# Patient Record
Sex: MALE | Race: BLACK OR AFRICAN AMERICAN | NOT HISPANIC OR LATINO | URBAN - METROPOLITAN AREA
[De-identification: names, ages, dates, MRNs, and addresses within clinical notes are randomized per-mention and may not be internally consistent; named-entity substitution may affect disease eponyms.]

---

## 2021-06-14 ENCOUNTER — EMERGENCY (EMERGENCY)
Facility: HOSPITAL | Age: 31
LOS: 1 days | Discharge: HOME | End: 2021-06-14
Admitting: EMERGENCY MEDICINE
Payer: MEDICARE

## 2021-06-14 VITALS
SYSTOLIC BLOOD PRESSURE: 136 MMHG | OXYGEN SATURATION: 97 % | HEART RATE: 78 BPM | DIASTOLIC BLOOD PRESSURE: 90 MMHG | HEIGHT: 70 IN | WEIGHT: 179.9 LBS | TEMPERATURE: 99 F | RESPIRATION RATE: 16 BRPM

## 2021-06-14 DIAGNOSIS — J02.9 ACUTE PHARYNGITIS, UNSPECIFIED: ICD-10-CM

## 2021-06-14 DIAGNOSIS — R59.9 ENLARGED LYMPH NODES, UNSPECIFIED: ICD-10-CM

## 2021-06-14 DIAGNOSIS — Z20.822 CONTACT WITH AND (SUSPECTED) EXPOSURE TO COVID-19: ICD-10-CM

## 2021-06-14 LAB
S PYO AG SPEC QL IA: NEGATIVE — SIGNIFICANT CHANGE UP
SARS-COV-2 RNA SPEC QL NAA+PROBE: SIGNIFICANT CHANGE UP

## 2021-06-14 PROCEDURE — U0003: CPT

## 2021-06-14 PROCEDURE — 87081 CULTURE SCREEN ONLY: CPT

## 2021-06-14 PROCEDURE — 87880 STREP A ASSAY W/OPTIC: CPT

## 2021-06-14 PROCEDURE — U0005: CPT

## 2021-06-14 PROCEDURE — 99283 EMERGENCY DEPT VISIT LOW MDM: CPT

## 2021-06-14 RX ORDER — BENZOCAINE AND MENTHOL 5; 1 G/100ML; G/100ML
1 LIQUID ORAL ONCE
Refills: 0 | Status: COMPLETED | OUTPATIENT
Start: 2021-06-14 | End: 2021-06-14

## 2021-06-14 RX ORDER — MUPIROCIN 20 MG/G
1 OINTMENT TOPICAL
Qty: 15 | Refills: 0
Start: 2021-06-14 | End: 2021-06-18

## 2021-06-14 RX ADMIN — BENZOCAINE AND MENTHOL 1 LOZENGE: 5; 1 LIQUID ORAL at 20:33

## 2021-06-14 NOTE — ED PROVIDER NOTE - PATIENT PORTAL LINK FT
You can access the FollowMyHealth Patient Portal offered by [facility] by registering at the following website: [email or web address]. By joining [facility]’s FollowMyHealth portal, you will also be able to view your health information using other applications (apps) compatible with our system.

## 2021-06-14 NOTE — ED ADULT TRIAGE NOTE - CHIEF COMPLAINT QUOTE
pt c/o left sided throat pain since this morning. pt states " I woke "feeling a lump in my throat" able to speak clear and in full sentences, no drooling noted. denies difficulty swallowing.

## 2021-06-14 NOTE — ED PROVIDER NOTE - CLINICAL SUMMARY MEDICAL DECISION MAKING FREE TEXT BOX
Afebrile and hemodynamically stable. He has no posterior oropharynx erythema, tonsillar edema or exudates. Uvula midline. He has a small pustule along his left beard-line and a palpable left anterior lymph node. He was vaccinated for covid (J&J) in May 2021. His rapid strep is neg. COVID sent and pending. Lymph node is likely reactive to small pustule, do not suspect strep pharyngitis, PTA, or otitis. Will give rx for mupirocin, warm compresses. Return precautions given.

## 2021-06-14 NOTE — ED PROVIDER NOTE - NS ED ROS FT
Constitutional: No fever. No chills.  Eyes: No redness. No discharge. No vision change.   ENT: No sore throat. No ear pain.  MSK: No joint pain. No back pain.   Skin: No rash. No abrasions. +pustule.  Neuro: No numbness. No weakness.   Psych: No known mental health issues.

## 2021-06-14 NOTE — ED PROVIDER NOTE - NSFOLLOWUPINSTRUCTIONS_ED_ALL_ED_FT
Your rapid strep test was negative.  Your covid test is pending.  Keep area clean and dry. Wash gently with warm water then pat dry. Apply thin layer of antibiotic ointment three times daily.  Between applications apply a warm compress to bring infection to the surface.  Return to the Emergency Department if you develop fever>100.4F, worsening swelling, difficulty swallowing, difficulty breathing or any other concerns.

## 2021-06-14 NOTE — ED PROVIDER NOTE - PHYSICAL EXAMINATION
VITAL SIGNS: I have reviewed nursing notes and confirm.  CONSTITUTIONAL: Well-developed; well-nourished; in no acute distress.   SKIN:  warm and dry, no acute rash. He has a small left sided pustule along his beard line.  HEAD:  normocephalic, atraumatic.  EYES: PERRL, EOM intact; conjunctiva and sclera clear.  ENT: No nasal discharge; airway clear. No posterior oropharynx erythema, tonsillar edema or exudates. He has a palpable swollen left anterior cervical node.   NECK: Supple; non tender.  EXT: Normal ROM. No clubbing, cyanosis or edema. 2+ pulses to b/l ue/le.  NEURO: Alert, oriented, grossly unremarkable  PSYCH: Cooperative, mood and affect appropriate.

## 2021-06-14 NOTE — ED PROVIDER NOTE - OBJECTIVE STATEMENT
30yo male presents with swollen lymph node over left side of neck. Pt reports painful, swollen node. Feels it may be related to a small facial pustule. Denies fever, chills, sore throat, difficulty swallowing, chest pain, shortness of breath, cough.